# Patient Record
Sex: MALE | Race: WHITE | NOT HISPANIC OR LATINO | Employment: OTHER | URBAN - NONMETROPOLITAN AREA
[De-identification: names, ages, dates, MRNs, and addresses within clinical notes are randomized per-mention and may not be internally consistent; named-entity substitution may affect disease eponyms.]

---

## 2017-06-09 ENCOUNTER — OFFICE VISIT (OUTPATIENT)
Dept: FAMILY MEDICINE CLINIC | Facility: CLINIC | Age: 82
End: 2017-06-09

## 2017-06-09 VITALS
WEIGHT: 194 LBS | BODY MASS INDEX: 27.77 KG/M2 | HEART RATE: 56 BPM | TEMPERATURE: 98.2 F | OXYGEN SATURATION: 99 % | DIASTOLIC BLOOD PRESSURE: 64 MMHG | HEIGHT: 70 IN | SYSTOLIC BLOOD PRESSURE: 138 MMHG

## 2017-06-09 DIAGNOSIS — K21.9 GASTROESOPHAGEAL REFLUX DISEASE, ESOPHAGITIS PRESENCE NOT SPECIFIED: ICD-10-CM

## 2017-06-09 DIAGNOSIS — I10 ESSENTIAL HYPERTENSION: Primary | Chronic | ICD-10-CM

## 2017-06-09 DIAGNOSIS — J44.9 CHRONIC OBSTRUCTIVE PULMONARY DISEASE, UNSPECIFIED COPD TYPE (HCC): ICD-10-CM

## 2017-06-09 DIAGNOSIS — I25.10 CORONARY ARTERIOSCLEROSIS: Chronic | ICD-10-CM

## 2017-06-09 DIAGNOSIS — R91.8 MULTIPLE NODULES OF LUNG: ICD-10-CM

## 2017-06-09 PROCEDURE — 99213 OFFICE O/P EST LOW 20 MIN: CPT | Performed by: NURSE PRACTITIONER

## 2017-06-09 RX ORDER — INSULIN DETEMIR 100 [IU]/ML
10 INJECTION, SOLUTION SUBCUTANEOUS NIGHTLY
COMMUNITY
Start: 2017-04-27

## 2017-06-09 NOTE — PROGRESS NOTES
Subjective   David Hines is a 84 y.o. male who presents to the office for UrgentCare follow-up.  Was seen recently, diagnosed with LLL pneumonia.   Also wishes to establish care today.  Denies chest pain, shortness of breath.  Last pneumonia immunization was couple years ago.  Has an appt to see [place] pulmonologist Reynold on 6/20.  PMH includes [place] Right ear, Type II DM following open-heart surgery, GERD, HTN and COPD.  Becomes blurry-eyed when hypoglycemia.  PSurgHx includes gallbladder out and he also has cardiac stents.  Cardiologist is [place].  Is an [employer].  Loves to go dancing at [employer].  Has been seeing Dr. Gomez.  History of Present Illness     The following portions of the patient's history were reviewed and updated as appropriate: allergies, current medications, past family history, past medical history, past social history, past surgical history and problem list.    Review of Systems   Constitutional: Negative for chills, fatigue and fever.   HENT: Negative for congestion, sneezing, sore throat and trouble swallowing.    Eyes: Negative for visual disturbance.   Respiratory: Positive for cough and shortness of breath. Negative for chest tightness and wheezing.    Cardiovascular: Negative for chest pain, palpitations and leg swelling.   Gastrointestinal: Negative for abdominal pain, constipation, diarrhea, nausea and vomiting.   Genitourinary: Negative for dysuria, frequency and urgency.   Musculoskeletal: Negative for neck pain.   Skin: Negative for rash.   Neurological: Negative for dizziness, weakness and headaches.   Psychiatric/Behavioral:        In the past two weeks the pt has not felt down, depressed, hopeless or lost interest in doing things   All other systems reviewed and are negative.      Objective   Physical Exam   Constitutional: He is oriented to person, place, and time. He appears well-developed and well-nourished. He is cooperative. He does  not have a sickly appearance. He does not appear ill.   Appears stated age   HENT:   Head: Normocephalic and atraumatic.   Right Ear: External ear normal.   Left Ear: External ear normal.   Nose: Nose normal.   Mouth/Throat: Uvula is midline, oropharynx is clear and moist and mucous membranes are normal.   Eyes: Conjunctivae, EOM and lids are normal. Pupils are equal, round, and reactive to light. Right eye exhibits no discharge. Left eye exhibits no discharge. No scleral icterus.   Neck: Trachea normal, normal range of motion and phonation normal. Neck supple. No thyromegaly present.   Cardiovascular: Normal rate, regular rhythm, normal heart sounds and intact distal pulses.  Exam reveals no gallop and no friction rub.    No murmur heard.  Pulmonary/Chest: Effort normal and breath sounds normal. No respiratory distress. He has no wheezes. He has no rales.   Abdominal: Soft. Normal appearance and bowel sounds are normal. He exhibits no distension. There is no tenderness. There is no rebound and no guarding.   Musculoskeletal: Normal range of motion. He exhibits no edema.       Vascular Status -  His exam exhibits right foot vasculature normal. His exam exhibits no right foot edema. His exam exhibits left foot vasculature normal. His exam exhibits no left foot edema.  Lymphadenopathy:     He has no cervical adenopathy.   Neurological: He is alert and oriented to person, place, and time. No cranial nerve deficit. GCS eye subscore is 4. GCS verbal subscore is 5. GCS motor subscore is 6.   Skin: Skin is warm, dry and intact. No rash noted.   Psychiatric: He has a normal mood and affect. His speech is normal and behavior is normal. Judgment and thought content normal. Cognition and memory are normal.   Nursing note and vitals reviewed.      Assessment/Plan       David was seen today for establish care.    Diagnoses and all orders for this visit:    Essential hypertension    Coronary arteriosclerosis    Multiple nodules  of lung    Chronic obstructive pulmonary disease, unspecified COPD type    Gastroesophageal reflux disease, esophagitis presence not specified

## 2017-06-11 PROBLEM — I10 HYPERTENSION: Chronic | Status: ACTIVE | Noted: 2017-06-11

## 2017-06-11 PROBLEM — J44.9 COPD (CHRONIC OBSTRUCTIVE PULMONARY DISEASE) (HCC): Status: ACTIVE | Noted: 2017-06-11

## 2017-06-11 PROBLEM — K21.9 GERD (GASTROESOPHAGEAL REFLUX DISEASE): Status: ACTIVE | Noted: 2017-06-11

## 2017-06-11 PROBLEM — E11.9 DIABETES MELLITUS (HCC): Chronic | Status: ACTIVE | Noted: 2017-06-11

## 2017-06-11 PROBLEM — I25.10 CORONARY ARTERIOSCLEROSIS: Chronic | Status: ACTIVE | Noted: 2017-06-11

## 2017-06-11 PROBLEM — R91.8 MULTIPLE NODULES OF LUNG: Status: ACTIVE | Noted: 2017-06-11

## 2019-05-28 ENCOUNTER — CLINICAL SUPPORT (OUTPATIENT)
Dept: AUDIOLOGY | Facility: CLINIC | Age: 84
End: 2019-05-28

## 2019-05-28 DIAGNOSIS — H91.8X3 OTHER SPECIFIED HEARING LOSS, BILATERAL: Primary | ICD-10-CM

## 2019-05-28 DIAGNOSIS — H69.81 EUSTACHIAN TUBE DYSFUNCTION, RIGHT: ICD-10-CM

## 2019-05-28 NOTE — PROGRESS NOTES
STANDARD AUDIOMETRIC EVALUATION      Name:  David Hines  :  1932  Age:  86 y.o.  Date of Evaluation:  2019      HISTORY    Reason for visit:  David Hines is seen today for a hearing test at the request of Dr. Winter Das.  Patient reports he can't hear in his right ear.  He states he has a history of fluid in his right ear which started while he was in [place].  He states he can hear his heartbeat in his right ear.  He states he has used a hearing aid in his right ear before and can hear well with it.  He states he currently does not have hearing aids.        EVALUATION    See Audiogram    RESULTS        Otoscopy and Tympanometry 226 Hz :  Right Ear:  Otoscopy:  Clear ear canal          Tympanometry:  Reduced pressure and compliance consistent with outer/middle ear involvement    Left Ear:   Otoscopy:  Clear ear canal        Tympanometry:  Middle ear function within normal limits    Test technique:  Standard Audiometry     Pure Tone Audiometry:   Patient responded to pure tones at  dB for 250-2000 Hz in right ear, and at  dB for 250-4000 Hz in left ear.  There were no responses to pure tones in the higher frequencies bilaterally.     Speech Audiometry:        Right Ear:  Speech Reception Threshold (SRT) was obtained at 75 dBHL                 Speech Discrimination scores were 44% in quiet when words were presented at 100 dBHL       Left Ear:  Speech Reception Threshold (SRT) was obtained at 50 dBHL                 Speech Discrimination scores were 28% in quiet when words were presented at 80 dBHL    Reliability:   good    IMPRESSIONS:  1.  Tympanometry results are consistent with Reduced pressure and compliance consistent with outer/middle ear involvement in right ear, and Middle ear function within normal limits in left ear.  2.  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       RECOMMENDATIONS:  Test results were reviewed with patient, and all questions were answered at this time. Test results will be forwarded to Dr. Winter Das for his review.  It was a pleasure seeing David Hines in Audiology today.  We would be happy to do further testing or discuss these test as necessary. My thanks to Dr. Winter Das for suggesting that Mr. Hines come to our department for his hearing needs.           This document has been electronically signed by Marisol Meyer MS CCC-FARNAZ on May 28, 2019 2:47 PM       Marisol Meyer MS CCC-A  Licensed Audiologist

## 2022-03-23 ENCOUNTER — TRANSCRIBE ORDERS (OUTPATIENT)
Dept: HOME HEALTH SERVICES | Facility: CLINIC | Age: 87
End: 2022-03-23

## 2022-03-23 ENCOUNTER — HOSPICE ADMISSION (OUTPATIENT)
Dept: HOSPICE | Facility: HOSPICE | Age: 87
End: 2022-03-23

## 2022-03-23 DIAGNOSIS — I26.99 BILATERAL PULMONARY EMBOLISM: Primary | ICD-10-CM

## 2022-03-24 ENCOUNTER — HOME CARE VISIT (OUTPATIENT)
Dept: HOME HEALTH SERVICES | Facility: CLINIC | Age: 87
End: 2022-03-24

## 2022-03-24 PROCEDURE — G0155 HHCP-SVS OF CSW,EA 15 MIN: HCPCS

## 2022-03-28 NOTE — HOSPICE
"Matt had consult with Ximena CARDONA as it relates to request to call jones before and after visit. Signee contacted  Jennie and she advised of her concerns for her uncle as she lived out of town and wanted me to report back as it related to patient's safety.     Signee called patient and completed travel screening  with no Covid-19 concerns.  Signee went to patient's home and he called out from inside the home for Signee to come in. Signee explained the reason for the visit and patient was a bit apprehensive in answering questions as he asked  \"why are you asking me information that you should already have in your notes\". When Signee told him that Jennie asked me to come see about him, he became willing to answer questions. Patient said that he lived alone and only needed to hire a part time caregiver. He said that [facility] comes to his home 4 days a week but that they didn't stay very long. He said his neighbors checked on him and does shopping for him but that they were sick. Mr. Hines explained that his nurse for [facility] fill his medication planner weekly and he takes his medications accordingly. He said that he has had some issues with keeping dry but that he has chucks for the bed and wears pull ups to keep himself dry.  Mr. Hines said that he wasn't sure why Hospice was called as he was happy with [facility]. Signee called Jennie while in the home per patient's permission and informed of patient's response.. When asked, patient said that he didn't want to go to a Nursing Home as he said that he had been at a facility not to long ago. Patient said that he wanted to continue to have physical therapy and just wanted part time help in the home.     Signee explained to Jennie that patient was alert and oriented and seemed able to make his own decision. Signee explained  the need for patient to have a caregiver in the home in order to be eligible for Hospice as we would not be coming to the home as much as " current provider. Patient wants to continue with physical therapy and he is able to verbalize his needs and desires appropriately.     Signee reiterated wishes to Jennie and Signee agreed to make referrals to community agency for part time help in the home.     Jennie and patient agreed to call Hospice office should  they needed our support in the future.     No further needs at this time. No admission per patient.